# Patient Record
Sex: MALE | Race: OTHER | URBAN - METROPOLITAN AREA
[De-identification: names, ages, dates, MRNs, and addresses within clinical notes are randomized per-mention and may not be internally consistent; named-entity substitution may affect disease eponyms.]

---

## 2018-08-17 ENCOUNTER — EMERGENCY (EMERGENCY)
Facility: HOSPITAL | Age: 58
LOS: 0 days | Discharge: HOME | End: 2018-08-17
Attending: EMERGENCY MEDICINE
Payer: MEDICAID

## 2018-08-17 VITALS
HEIGHT: 65 IN | WEIGHT: 130.07 LBS | SYSTOLIC BLOOD PRESSURE: 129 MMHG | TEMPERATURE: 98 F | RESPIRATION RATE: 16 BRPM | OXYGEN SATURATION: 99 % | HEART RATE: 73 BPM | DIASTOLIC BLOOD PRESSURE: 76 MMHG

## 2018-08-17 VITALS
OXYGEN SATURATION: 98 % | HEART RATE: 70 BPM | TEMPERATURE: 98 F | RESPIRATION RATE: 17 BRPM | DIASTOLIC BLOOD PRESSURE: 72 MMHG | SYSTOLIC BLOOD PRESSURE: 140 MMHG

## 2018-08-17 DIAGNOSIS — S20.20XA CONTUSION OF THORAX, UNSPECIFIED, INITIAL ENCOUNTER: ICD-10-CM
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
## 2018-08-17 DIAGNOSIS — R07.81 PLEURODYNIA: ICD-10-CM

## 2018-08-17 DIAGNOSIS — S72.002A FRACTURE OF UNSPECIFIED PART OF NECK OF LEFT FEMUR, INITIAL ENCOUNTER FOR CLOSED FRACTURE: ICD-10-CM
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
## 2018-08-17 DIAGNOSIS — S50.312A ABRASION OF LEFT ELBOW, INITIAL ENCOUNTER: ICD-10-CM

## 2018-08-17 LAB
ALBUMIN SERPL ELPH-MCNC: 3.9 G/DL — SIGNIFICANT CHANGE UP (ref 3.3–5)
ALP SERPL-CCNC: 72 U/L — SIGNIFICANT CHANGE UP (ref 40–120)
ALT FLD-CCNC: 33 U/L — SIGNIFICANT CHANGE UP (ref 12–78)
AMYLASE P1 CFR SERPL: 50 U/L — SIGNIFICANT CHANGE UP (ref 25–115)
ANION GAP SERPL CALC-SCNC: 8 MMOL/L — SIGNIFICANT CHANGE UP (ref 5–17)
APTT BLD: 29.4 SEC — SIGNIFICANT CHANGE UP (ref 27.5–37.4)
AST SERPL-CCNC: 45 U/L — HIGH (ref 15–37)
BASOPHILS # BLD AUTO: 0.02 K/UL — SIGNIFICANT CHANGE UP (ref 0–0.2)
BASOPHILS NFR BLD AUTO: 0.2 % — SIGNIFICANT CHANGE UP (ref 0–2)
BILIRUB SERPL-MCNC: 0.9 MG/DL — SIGNIFICANT CHANGE UP (ref 0.2–1.2)
BUN SERPL-MCNC: 16 MG/DL — SIGNIFICANT CHANGE UP (ref 7–23)
CALCIUM SERPL-MCNC: 8.4 MG/DL — LOW (ref 8.5–10.1)
CHLORIDE SERPL-SCNC: 108 MMOL/L — SIGNIFICANT CHANGE UP (ref 96–108)
CO2 SERPL-SCNC: 27 MMOL/L — SIGNIFICANT CHANGE UP (ref 22–31)
CREAT SERPL-MCNC: 0.87 MG/DL — SIGNIFICANT CHANGE UP (ref 0.5–1.3)
EOSINOPHIL # BLD AUTO: 0.02 K/UL — SIGNIFICANT CHANGE UP (ref 0–0.5)
EOSINOPHIL NFR BLD AUTO: 0.2 % — SIGNIFICANT CHANGE UP (ref 0–6)
GLUCOSE SERPL-MCNC: 106 MG/DL — HIGH (ref 70–99)
HCT VFR BLD CALC: 41 % — SIGNIFICANT CHANGE UP (ref 39–50)
HGB BLD-MCNC: 14 G/DL — SIGNIFICANT CHANGE UP (ref 13–17)
IMM GRANULOCYTES NFR BLD AUTO: 0.5 % — SIGNIFICANT CHANGE UP (ref 0–1.5)
INR BLD: 1.1 RATIO — SIGNIFICANT CHANGE UP (ref 0.88–1.16)
LIDOCAIN IGE QN: 147 U/L — SIGNIFICANT CHANGE UP (ref 73–393)
LYMPHOCYTES # BLD AUTO: 0.8 K/UL — LOW (ref 1–3.3)
LYMPHOCYTES # BLD AUTO: 9.9 % — LOW (ref 13–44)
MCHC RBC-ENTMCNC: 32.9 PG — SIGNIFICANT CHANGE UP (ref 27–34)
MCHC RBC-ENTMCNC: 34.1 GM/DL — SIGNIFICANT CHANGE UP (ref 32–36)
MCV RBC AUTO: 96.5 FL — SIGNIFICANT CHANGE UP (ref 80–100)
MONOCYTES # BLD AUTO: 0.47 K/UL — SIGNIFICANT CHANGE UP (ref 0–0.9)
MONOCYTES NFR BLD AUTO: 5.8 % — SIGNIFICANT CHANGE UP (ref 2–14)
NEUTROPHILS # BLD AUTO: 6.72 K/UL — SIGNIFICANT CHANGE UP (ref 1.8–7.4)
NEUTROPHILS NFR BLD AUTO: 83.4 % — HIGH (ref 43–77)
NRBC # BLD: 0 /100 WBCS — SIGNIFICANT CHANGE UP (ref 0–0)
PLATELET # BLD AUTO: 169 K/UL — SIGNIFICANT CHANGE UP (ref 150–400)
POTASSIUM SERPL-MCNC: 3.8 MMOL/L — SIGNIFICANT CHANGE UP (ref 3.5–5.3)
POTASSIUM SERPL-SCNC: 3.8 MMOL/L — SIGNIFICANT CHANGE UP (ref 3.5–5.3)
PROT SERPL-MCNC: 7.6 GM/DL — SIGNIFICANT CHANGE UP (ref 6–8.3)
PROTHROM AB SERPL-ACNC: 12 SEC — SIGNIFICANT CHANGE UP (ref 9.8–12.7)
RBC # BLD: 4.25 M/UL — SIGNIFICANT CHANGE UP (ref 4.2–5.8)
RBC # FLD: 12.2 % — SIGNIFICANT CHANGE UP (ref 10.3–14.5)
SODIUM SERPL-SCNC: 143 MMOL/L — SIGNIFICANT CHANGE UP (ref 135–145)
WBC # BLD: 8.07 K/UL — SIGNIFICANT CHANGE UP (ref 3.8–10.5)
WBC # FLD AUTO: 8.07 K/UL — SIGNIFICANT CHANGE UP (ref 3.8–10.5)

## 2018-08-17 PROCEDURE — 71045 X-RAY EXAM CHEST 1 VIEW: CPT | Mod: 26

## 2018-08-17 PROCEDURE — 73721 MRI JNT OF LWR EXTRE W/O DYE: CPT | Mod: 26,LT

## 2018-08-17 PROCEDURE — 70450 CT HEAD/BRAIN W/O DYE: CPT | Mod: 26

## 2018-08-17 PROCEDURE — 74177 CT ABD & PELVIS W/CONTRAST: CPT | Mod: 26

## 2018-08-17 PROCEDURE — 71250 CT THORAX DX C-: CPT | Mod: 26

## 2018-08-17 PROCEDURE — 99285 EMERGENCY DEPT VISIT HI MDM: CPT

## 2018-08-17 PROCEDURE — 73502 X-RAY EXAM HIP UNI 2-3 VIEWS: CPT | Mod: 26,LT

## 2018-08-17 RX ORDER — SODIUM CHLORIDE 9 MG/ML
1000 INJECTION INTRAMUSCULAR; INTRAVENOUS; SUBCUTANEOUS ONCE
Qty: 0 | Refills: 0 | Status: COMPLETED | OUTPATIENT
Start: 2018-08-17 | End: 2018-08-17

## 2018-08-17 RX ORDER — MORPHINE SULFATE 50 MG/1
2 CAPSULE, EXTENDED RELEASE ORAL ONCE
Qty: 0 | Refills: 0 | Status: DISCONTINUED | OUTPATIENT
Start: 2018-08-17 | End: 2018-08-17

## 2018-08-17 RX ORDER — MORPHINE SULFATE 50 MG/1
4 CAPSULE, EXTENDED RELEASE ORAL ONCE
Qty: 0 | Refills: 0 | Status: DISCONTINUED | OUTPATIENT
Start: 2018-08-17 | End: 2018-08-17

## 2018-08-17 RX ORDER — SODIUM CHLORIDE 9 MG/ML
1000 INJECTION, SOLUTION INTRAVENOUS
Qty: 0 | Refills: 0 | Status: DISCONTINUED | OUTPATIENT
Start: 2018-08-17 | End: 2018-08-17

## 2018-08-17 RX ADMIN — MORPHINE SULFATE 2 MILLIGRAM(S): 50 CAPSULE, EXTENDED RELEASE ORAL at 19:31

## 2018-08-17 RX ADMIN — SODIUM CHLORIDE 125 MILLILITER(S): 9 INJECTION, SOLUTION INTRAVENOUS at 17:53

## 2018-08-17 RX ADMIN — SODIUM CHLORIDE 1000 MILLILITER(S): 9 INJECTION INTRAMUSCULAR; INTRAVENOUS; SUBCUTANEOUS at 17:51

## 2018-08-17 RX ADMIN — SODIUM CHLORIDE 2000 MILLILITER(S): 9 INJECTION INTRAMUSCULAR; INTRAVENOUS; SUBCUTANEOUS at 12:58

## 2018-08-17 NOTE — ED ADULT NURSE NOTE - NSIMPLEMENTINTERV_GEN_ALL_ED
Implemented All Fall Risk Interventions:  [place] to call system. Call bell, personal items and telephone within reach. Instruct patient to call for assistance. Room bathroom lighting operational. Non-slip footwear when patient is off stretcher. Physically safe environment: no spills, clutter or unnecessary equipment. Stretcher in lowest position, wheels locked, appropriate side rails in place. Provide visual cue, wrist band, yellow gown, etc. Monitor gait and stability. Monitor for mental status changes and reorient to person, place, and time. Review medications for side effects contributing to fall risk. Reinforce activity limits and safety measures with patient and family.

## 2018-08-17 NOTE — ED ADULT NURSE NOTE - OBJECTIVE STATEMENT
Patient stated that he missed a step and fell, denies hitting head, denies syncope, reports pain of 6 on a scale of 0 to , pain in left ribs, left elbow and left hip

## 2018-08-17 NOTE — CONSULT NOTE ADULT - SUBJECTIVE AND OBJECTIVE BOX
58y Male community ambulator presents c/o L hip and Rib pain s/p fall down stairs yesterday at work.  Denies HS/LOC. Patient was able to ambulate after fall, presented to ED for persistent L Sided rib pain. Denies numbness/tingling. Denies fever/chills. Denies pain/injury elsewhere.     PAST MEDICAL & SURGICAL HISTORY: Denies    MEDICATIONS  (STANDING):  lactated ringers. 1000 milliLiter(s) IV Continuous <Continuous>    Allergies    No Known Allergies    Intolerances                            14.0   8.07  )-----------( 169      ( 17 Aug 2018 13:07 )             41.0     17 Aug 2018 13:07    143    |  108    |  16     ----------------------------<  106    3.8     |  27     |  0.87     Ca    8.4        17 Aug 2018 13:07    TPro  7.6    /  Alb  3.9    /  TBili  0.9    /  DBili  x      /  AST  45     /  ALT  33     /  AlkPhos  72     17 Aug 2018 13:07    PT/INR - ( 17 Aug 2018 13:07 )   PT: 12.0 sec;   INR: 1.10 ratio         PTT - ( 17 Aug 2018 13:07 )  PTT:29.4 sec  Vital Signs Last 24 Hrs  T(C): 36.8 (08-17-18 @ 15:45), Max: 36.8 (08-17-18 @ 10:59)  T(F): 98.2 (08-17-18 @ 15:45), Max: 98.2 (08-17-18 @ 10:59)  HR: 70 (08-17-18 @ 15:45) (70 - 73)  BP: 140/72 (08-17-18 @ 15:45) (129/76 - 140/72)  RR: 17 (08-17-18 @ 15:45) (16 - 17)  SpO2: 98% (08-17-18 @ 15:45) (98% - 99%)    Imaging: XR Hip/Pelvis: L GT Fx  CT A/P: L GT Fx  MRI L Hip: L Gluteal hematoma, no signs of IT Fx Extension.     Physical Exam  General: NAD, Alert, Awake and oriented  Head: NCAT without abrasions, lacerations, or ecchymosis to head, face, or scalp  Neck/C-Spine: FAROM. No bony TTP.  T/L Spine: No bony TTP, no palpable step-off.      LEFT LE:  No open skin. Contusion seen on L gluteal region and L Lateral knee.  TTP along L Gluteal region and GT, no TTP along Knee/patella/Ankle.  Full painless PROM/AROM of L ankle/knee, mild pain with AROM of L Hip, pain with Int/Ext Rotation of L Hip. Negative log-roll and heel strike, able to SLR.  SILT L2-S1. 2+ DP/PT pulses with brisk cap refill distally. Compartments soft and compressible. EHL/FHL/TA/GS intact 5/5.    Secondary Survey: small abrasion seen along L posterior elbow.  No TTP along bony prominences or long bones, full painless ROM throughout, able to SLR B/L, ambulatory, SILT, Compartments soft.       A/P: 58y Male with Left Greater Trochanter Fx  MRI demonstrates no clear extension into IT region, therefore no orthopedic sx intervention at this time  Pain control  Rest/ice/Elevate LLE  WBAT with ABDUCTION precautions of L Hip   Antiinflammatories as tolerated  PT/OT  Please FU with Dr Osorio in the office in 1-2 weeks for further management  Ortho Stable for DC pending final read of MRI confirming no Fx extension  Discussed with Orthopedic Attending Dr. Osorio and agrees with above plan

## 2018-08-17 NOTE — ED PROVIDER NOTE - PROGRESS NOTE DETAILS
case discussed with ortho patient received mri of hip and ortho consult, patient ambulatory in ED; will dc as per ortho with f/u with Dr. Osorio

## 2018-08-17 NOTE — ED PROVIDER NOTE - MEDICAL DECISION MAKING DETAILS
Patient s/p fall with left rib pain, found to have left hip fracture; Patient s/p fall with left rib pain, found to have left hip fracture; case discussed with Ortho after MRI, patient ambulatory in ed without assistance, will dc home with abduction precautions, and f/u with Dr. escobedo, pain management; patient refusing crutches

## 2018-08-17 NOTE — ED PROVIDER NOTE - OBJECTIVE STATEMENT
57 yo male presents with left sided lower rib pain after fall down 3-4 concrete steps today. Patient denies shortness of breath, abdominal pain, head injury, fever, chills, neck pain. Pain is 8/10 at present. Patient denies use of blood thinner.

## 2018-08-17 NOTE — ED PROVIDER NOTE - CARE PLAN
Principal Discharge DX:	Hip fracture, left, closed, initial encounter  Secondary Diagnosis:	Rib contusion, left, initial encounter

## 2018-08-17 NOTE — ED PROVIDER NOTE - CONSTITUTIONAL, MLM
normal... Well appearing, well nourished, awake, alert, oriented to person, place, time/situation and in discomfort

## 2021-08-18 ENCOUNTER — EMERGENCY (EMERGENCY)
Facility: HOSPITAL | Age: 61
LOS: 1 days | Discharge: HOME | End: 2021-08-18
Attending: EMERGENCY MEDICINE
Payer: SELF-PAY

## 2021-08-18 VITALS
SYSTOLIC BLOOD PRESSURE: 170 MMHG | OXYGEN SATURATION: 97 % | HEART RATE: 59 BPM | WEIGHT: 169.98 LBS | TEMPERATURE: 98 F | RESPIRATION RATE: 16 BRPM | HEIGHT: 66 IN | DIASTOLIC BLOOD PRESSURE: 85 MMHG

## 2021-08-18 VITALS
HEART RATE: 58 BPM | TEMPERATURE: 98 F | RESPIRATION RATE: 16 BRPM | OXYGEN SATURATION: 98 % | SYSTOLIC BLOOD PRESSURE: 160 MMHG | DIASTOLIC BLOOD PRESSURE: 98 MMHG

## 2021-08-18 LAB
BLD GP AB SCN SERPL QL: NEGATIVE — SIGNIFICANT CHANGE UP
RH IG SCN BLD-IMP: POSITIVE — SIGNIFICANT CHANGE UP
SARS-COV-2 RNA SPEC QL NAA+PROBE: SIGNIFICANT CHANGE UP

## 2021-08-18 PROCEDURE — 73610 X-RAY EXAM OF ANKLE: CPT

## 2021-08-18 PROCEDURE — 73630 X-RAY EXAM OF FOOT: CPT | Mod: 26,LT

## 2021-08-18 PROCEDURE — U0003: CPT

## 2021-08-18 PROCEDURE — 86900 BLOOD TYPING SEROLOGIC ABO: CPT

## 2021-08-18 PROCEDURE — 90715 TDAP VACCINE 7 YRS/> IM: CPT

## 2021-08-18 PROCEDURE — 90471 IMMUNIZATION ADMIN: CPT

## 2021-08-18 PROCEDURE — 99284 EMERGENCY DEPT VISIT MOD MDM: CPT | Mod: 25

## 2021-08-18 PROCEDURE — 73590 X-RAY EXAM OF LOWER LEG: CPT

## 2021-08-18 PROCEDURE — 73502 X-RAY EXAM HIP UNI 2-3 VIEWS: CPT | Mod: 26,LT

## 2021-08-18 PROCEDURE — 73502 X-RAY EXAM HIP UNI 2-3 VIEWS: CPT

## 2021-08-18 PROCEDURE — 86901 BLOOD TYPING SEROLOGIC RH(D): CPT

## 2021-08-18 PROCEDURE — 86850 RBC ANTIBODY SCREEN: CPT

## 2021-08-18 PROCEDURE — 73630 X-RAY EXAM OF FOOT: CPT

## 2021-08-18 PROCEDURE — 73610 X-RAY EXAM OF ANKLE: CPT | Mod: 26,LT

## 2021-08-18 PROCEDURE — 73590 X-RAY EXAM OF LOWER LEG: CPT | Mod: 26,LT

## 2021-08-18 PROCEDURE — U0005: CPT

## 2021-08-18 PROCEDURE — 99284 EMERGENCY DEPT VISIT MOD MDM: CPT

## 2021-08-18 PROCEDURE — 73562 X-RAY EXAM OF KNEE 3: CPT

## 2021-08-18 PROCEDURE — 73562 X-RAY EXAM OF KNEE 3: CPT | Mod: 26,LT

## 2021-08-18 PROCEDURE — 73552 X-RAY EXAM OF FEMUR 2/>: CPT

## 2021-08-18 PROCEDURE — 73552 X-RAY EXAM OF FEMUR 2/>: CPT | Mod: 26,LT

## 2021-08-18 RX ORDER — TETANUS TOXOID, REDUCED DIPHTHERIA TOXOID AND ACELLULAR PERTUSSIS VACCINE, ADSORBED 5; 2.5; 8; 8; 2.5 [IU]/.5ML; [IU]/.5ML; UG/.5ML; UG/.5ML; UG/.5ML
0.5 SUSPENSION INTRAMUSCULAR ONCE
Refills: 0 | Status: COMPLETED | OUTPATIENT
Start: 2021-08-18 | End: 2021-08-18

## 2021-08-18 RX ORDER — IBUPROFEN 200 MG
600 TABLET ORAL ONCE
Refills: 0 | Status: COMPLETED | OUTPATIENT
Start: 2021-08-18 | End: 2021-08-18

## 2021-08-18 RX ORDER — OXYCODONE HYDROCHLORIDE 5 MG/1
5 TABLET ORAL ONCE
Refills: 0 | Status: DISCONTINUED | OUTPATIENT
Start: 2021-08-18 | End: 2021-08-18

## 2021-08-18 RX ADMIN — Medication 600 MILLIGRAM(S): at 11:35

## 2021-08-18 RX ADMIN — TETANUS TOXOID, REDUCED DIPHTHERIA TOXOID AND ACELLULAR PERTUSSIS VACCINE, ADSORBED 0.5 MILLILITER(S): 5; 2.5; 8; 8; 2.5 SUSPENSION INTRAMUSCULAR at 11:39

## 2021-08-18 RX ADMIN — OXYCODONE HYDROCHLORIDE 5 MILLIGRAM(S): 5 TABLET ORAL at 11:36

## 2021-08-18 NOTE — ED ADULT NURSE NOTE - NS ED NOTE ABUSE RESPONSE YN
You can access the FollowMyHealth Patient Portal offered by [facility] by registering at the following website: [email or web address]. By joining [employer]’s FollowMyHealth portal, you will also be able to view your health information using other applications (apps) compatible with our system.
Yes

## 2021-08-18 NOTE — ED PROVIDER NOTE - CARE PLAN
Principal Discharge DX:	Sprain of knee/leg, initial encounter  Secondary Diagnosis:	Abrasion of left lower extremity   1

## 2021-08-18 NOTE — ED PROVIDER NOTE - NSFOLLOWUPCLINICS_GEN_ALL_ED_FT
[facility] Orthopedic Surgery  Orthopedic Surgery  [address], 3rd & 4th [place] [address]  Phone: [phone]  Fax:

## 2021-08-18 NOTE — ED PROVIDER NOTE - OBJECTIVE STATEMENT
61y M no reported sig pmhx presents to ED BIBEMS w/ entire L leg in splinted c/o LLE pain s/p being struck falling tree limb PTA. Pt works as /remover. As per EMS on arrival pt on ground, unable to bear weight. C/o of LLE pain focal to knee/shin/toes. Denies loss of sensation. No cp, back pain, head trauma. Unknown tetanus status.

## 2021-08-18 NOTE — ED PROVIDER NOTE - PATIENT PORTAL LINK FT
You can access the FollowMyHealth Patient Portal offered by [facility] by registering at the following website: [email or web address]. By joining [employer]’s FollowMyHealth portal, you will also be able to view your health information using other applications (apps) compatible with our system.

## 2021-08-18 NOTE — ED PROVIDER NOTE - ATTENDING CONTRIBUTION TO CARE
L leg pain after "branch" fell on it  dw ems - very large - essentially full tree  LLE w abr / no point bony ttp w from to joints  xr , Symptomatic treatment.

## 2021-08-18 NOTE — ED PROVIDER NOTE - NSFOLLOWUPINSTRUCTIONS_ED_ALL_ED_FT
Follow-up with your PMD within 1-2 days.  Follow-up with orthopedics within 7 days.  Bring all printed results to discuss with your provider.    Rest the affected area as much as possible. Elevate the area, use ice to reduce swelling and pain.    Pain can be managed with Tylenol and Ibuprofen over the counter as directed.    Continue to take all other medications as directed.    Return to the emergency room immediately if your symptoms worsen or persist, or if you have a high or concerning fever, new or worsening pain in the affected area, numbness or tingling of the affected area, inability to move the affected area, increased swelling, redness of affected area, or if any other concerning or questionable symptoms.

## 2021-08-18 NOTE — ED ADULT NURSE NOTE - OBJECTIVE STATEMENT
pt is a 61 y.o. male complaining of leg pain after having a tree branch fall on him during work. pt has abrasions to the left leg and foot but no large lesions or deformities to the leg visible. lungs clear bilaterally, left lower extremity swelling r/t injury from today, other than the abrasion skin is dry and intact, no abdominal pain noted, no abdominal distension, vital signs stable. pt states that he has HTN that is controlled by medication currently but no other PMH.

## 2021-08-18 NOTE — ED PROVIDER NOTE - PHYSICAL EXAMINATION
GEN: Pt non-toxic uncomfortable in NAD, A&Ox3.  PSYCH: Affect and mood appropriate.  EYES: Sclera white w/o injection, EOMI, PERRLA.   ENT: Head NCAT. Neck supple FROM. Airway patent.  RESP: No chest wall tenderness, CTA b/l, no wheezes, rales, or rhonchi.   CARDIAC: RRR, clear distinct S1, S2, no appreciable murmurs.  ABD: Abdomen soft, non-tender. No CVAT b/l.  MSK: LLE w/o obvious deformity, focal ttp over hip/knee/shin/toes, ROM hip/knee limited 2/2 pain, FROM ankle, ~6in linear nonbleeding abrasion L shin. n/v intact distally. No spinal ttp.  NEURO: No focal deficits.  VASC: Dorsalis pedis pulses 2+ b/l. No edema or calf tenderness.  SKIN: See MSK. No rashes or lesions. GEN: Pt non-toxic uncomfortable in NAD, A&Ox3.  PSYCH: Affect and mood appropriate.  EYES: Sclera white w/o injection, EOMI, PERRLA.   ENT: Head NCAT. Neck supple FROM. Airway patent.  RESP: No chest wall tenderness, CTA b/l, no wheezes, rales, or rhonchi.   CARDIAC: RRR, clear distinct S1, S2, no appreciable murmurs.  ABD: Abdomen soft, non-tender. No CVAT b/l.  MSK: LLE w/o obvious deformity, focal ttp over hip/knee/shin/toes, ROM hip/knee limited 2/2 pain, FROM ankle, ~6in linear nonbleeding abrasion L shin. Compartments soft. n/v intact distally. No spinal ttp.  NEURO: No focal deficits.  VASC: Dorsalis pedis pulses 2+ b/l. No edema or calf tenderness.  SKIN: See MSK. No rashes or lesions.

## 2021-08-18 NOTE — ED PROVIDER NOTE - PROGRESS NOTE DETAILS
Reji Hopper PA-C: xrays negative. Likely ligamentous injury. Able to range L knee with some discomfort. Able to bear weight but with DC with crutches, pt shows proper use. L shin abrasion cleaned, bacitracin applied w/ wrap. Will give ortho f/u. Pt states he has seen orthopedic in past for R knee issues. All results and DC instructions reviewed w/ pt. All questions answered.

## 2023-04-18 NOTE — ED PROVIDER NOTE - NSTOBACCONEVERSMOKERY/N_GEN_A
[FreeTextEntry1] : Food poisoning: Resolving. No signs of acute abdomen. Discussed BRAT diet. \par Lumbar strain: No focal signs on exam. Recommended continue ice PRN. \par HCM: Up to date on colonoscopy. Check labs. Will discuss results. EKg reviewed and show SR. \par  No

## 2023-05-31 NOTE — ED ADULT NURSE NOTE - PRIMARY CARE PROVIDER
do not have one Bexarotene Pregnancy And Lactation Text: This medication is Pregnancy Category X and should not be given to women who are pregnant or may become pregnant. This medication should not be used if you are breast feeding.

## 2025-02-21 NOTE — ED ADULT NURSE NOTE - CAS DISCH BELONGINGS RETURNED
Show Applicator Variable?: Yes
Render Note In Bullet Format When Appropriate: No
Detail Level: Detailed
Consent: The patient's consent was obtained including but not limited to risks of crusting, scabbing, blistering, scarring, darker or lighter pigmentary change, recurrence, incomplete removal and infection.
Post-Care Instructions: I reviewed with the patient in detail post-care instructions. Patient is to wear sunprotection, and avoid picking at any of the treated lesions. Pt may apply Vaseline to crusted or scabbing areas.
Duration Of Freeze Thaw-Cycle (Seconds): 2
Number Of Freeze-Thaw Cycles: 1 freeze-thaw cycle
pt has cellphone and boot in belongings bag/Not applicable